# Patient Record
Sex: FEMALE | Race: WHITE
[De-identification: names, ages, dates, MRNs, and addresses within clinical notes are randomized per-mention and may not be internally consistent; named-entity substitution may affect disease eponyms.]

---

## 2021-01-13 ENCOUNTER — HOSPITAL ENCOUNTER (OUTPATIENT)
Dept: HOSPITAL 18 - NAV RAD | Age: 66
Discharge: HOME | End: 2021-01-13
Payer: MEDICARE

## 2021-01-13 DIAGNOSIS — R07.81: Primary | ICD-10-CM

## 2021-01-13 NOTE — RAD
Exam: Chest one view

3 views left RIBS



HISTORY: Fall. Left rib pain



FINDINGS: Normal cardiac silhouette. Pulmonary vessels and hilum are normal. Costophrenic angles are 
clear. No masses or consolidation. No pneumothorax. No osseous abnormalities

Left rib series: No fracture, cortical irregularity or periosteal reaction.



IMPRESSION:

1. No evidence of a left rib fracture

2. No acute cardiopulmonary process.



Reported By: Antonio Osman 

Electronically Signed:  1/13/2021 2:09 PM